# Patient Record
Sex: MALE | Race: BLACK OR AFRICAN AMERICAN | NOT HISPANIC OR LATINO | Employment: OTHER | URBAN - METROPOLITAN AREA
[De-identification: names, ages, dates, MRNs, and addresses within clinical notes are randomized per-mention and may not be internally consistent; named-entity substitution may affect disease eponyms.]

---

## 2019-07-08 ENCOUNTER — HISTORICAL (OUTPATIENT)
Dept: ANESTHESIOLOGY | Facility: HOSPITAL | Age: 59
End: 2019-07-08

## 2020-04-29 PROBLEM — C90.00 MULTIPLE MYELOMA: Status: ACTIVE | Noted: 2019-12-07

## 2020-04-30 PROBLEM — G89.29 BACK PAIN, CHRONIC: Status: ACTIVE | Noted: 2019-12-01

## 2020-04-30 PROBLEM — M54.9 BACK PAIN, CHRONIC: Status: ACTIVE | Noted: 2019-12-01

## 2020-05-01 PROBLEM — F17.210 SMOKING 1/2 PACK A DAY OR LESS: Chronic | Status: ACTIVE | Noted: 2020-05-01

## 2020-06-22 PROBLEM — Z52.011 AUTOLOGOUS DONOR OF STEM CELLS: Status: ACTIVE | Noted: 2020-06-22

## 2020-06-30 ENCOUNTER — TELEPHONE (OUTPATIENT)
Dept: PHARMACY | Facility: CLINIC | Age: 60
End: 2020-06-30

## 2020-06-30 PROBLEM — R76.8 HEPATITIS B CORE ANTIBODY POSITIVE: Status: ACTIVE | Noted: 2020-06-30

## 2020-06-30 PROBLEM — Z29.9 NEED FOR PROPHYLACTIC MEASURE: Status: ACTIVE | Noted: 2020-06-30

## 2020-06-30 NOTE — TELEPHONE ENCOUNTER
Informed Spouse Holli  that [facility] received prescription for Entecavir and benefits investigation is required.  [facility] will be back in touch once insurance determination is received.

## 2020-07-06 PROBLEM — Z51.11 ENCOUNTER FOR ANTINEOPLASTIC CHEMOTHERAPY: Status: ACTIVE | Noted: 2020-07-06

## 2020-07-08 PROBLEM — E83.51 HYPOCALCEMIA: Status: ACTIVE | Noted: 2020-07-08

## 2020-07-09 PROBLEM — I10 HTN (HYPERTENSION): Status: ACTIVE | Noted: 2020-07-09

## 2020-07-11 PROBLEM — E83.39 HYPOPHOSPHATEMIA: Status: ACTIVE | Noted: 2020-07-11

## 2020-07-15 PROBLEM — D70.9 NEUTROPENIA: Status: ACTIVE | Noted: 2020-07-15

## 2020-07-15 PROBLEM — M25.519 SHOULDER PAIN: Status: ACTIVE | Noted: 2020-07-15

## 2020-07-16 PROBLEM — R11.0 NAUSEA: Status: ACTIVE | Noted: 2020-07-16

## 2020-07-16 PROBLEM — E87.6 HYPOKALEMIA: Status: ACTIVE | Noted: 2020-07-16

## 2020-07-17 PROBLEM — R19.7 DIARRHEA: Status: ACTIVE | Noted: 2020-07-17

## 2020-07-19 PROBLEM — R51.9 HEADACHE: Status: ACTIVE | Noted: 2020-07-19

## 2020-07-21 PROBLEM — R79.9 HEMATOLOGIC ABNORMALITY: Status: ACTIVE | Noted: 2020-07-21

## 2020-07-21 PROBLEM — B99.9 INFECTIOUS DISEASE: Status: ACTIVE | Noted: 2020-07-21

## 2020-07-21 PROBLEM — E83.51 HYPOCALCEMIA: Status: ACTIVE | Noted: 2020-07-21

## 2020-09-02 PROBLEM — R19.7 DIARRHEA: Status: RESOLVED | Noted: 2020-07-17 | Resolved: 2020-09-02

## 2020-09-02 PROBLEM — R11.0 NAUSEA: Status: RESOLVED | Noted: 2020-07-16 | Resolved: 2020-09-02

## 2020-09-02 PROBLEM — D70.9 NEUTROPENIA: Status: RESOLVED | Noted: 2020-07-15 | Resolved: 2020-09-02

## 2020-09-02 PROBLEM — R51.9 HEADACHE: Status: RESOLVED | Noted: 2020-07-19 | Resolved: 2020-09-02

## 2020-12-17 PROBLEM — Z92.21 S/P CHEMOTHERAPY, TIME SINCE GREATER THAN 12 WEEKS: Status: ACTIVE | Noted: 2020-12-17

## 2020-12-17 PROBLEM — Z79.899 MEDICATION MANAGEMENT: Status: ACTIVE | Noted: 2020-07-06

## 2020-12-17 PROBLEM — D64.9 ANEMIA: Status: ACTIVE | Noted: 2020-12-17

## 2020-12-17 PROBLEM — Z94.81 S/P AUTOLOGOUS BONE MARROW TRANSPLANTATION: Status: ACTIVE | Noted: 2020-12-17

## 2021-01-14 PROBLEM — D84.9 IMMUNOCOMPROMISED: Status: ACTIVE | Noted: 2021-01-14

## 2021-07-12 PROBLEM — F43.22 ADJUSTMENT DISORDER WITH ANXIETY: Status: ACTIVE | Noted: 2021-07-12

## 2021-07-12 PROBLEM — Z86.19 HISTORY OF HEPATITIS B: Status: ACTIVE | Noted: 2021-07-12

## 2021-07-12 PROBLEM — G47.33 OBSTRUCTIVE SLEEP APNEA SYNDROME: Status: ACTIVE | Noted: 2021-07-12

## 2021-07-12 PROBLEM — B35.3 TINEA PEDIS: Status: ACTIVE | Noted: 2021-07-12

## 2021-07-12 PROBLEM — M51.26 HERNIATED LUMBAR INTERVERTEBRAL DISC: Status: ACTIVE | Noted: 2021-07-12

## 2021-07-12 PROBLEM — Z72.0 TOBACCO USER: Status: ACTIVE | Noted: 2021-07-12

## 2021-07-12 PROBLEM — M54.16 LUMBAR RADICULOPATHY: Status: ACTIVE | Noted: 2021-07-12

## 2021-07-12 PROBLEM — E78.5 HYPERLIPIDEMIA: Status: ACTIVE | Noted: 2021-07-12

## 2021-09-09 PROBLEM — C90.01 MULTIPLE MYELOMA IN REMISSION: Status: ACTIVE | Noted: 2019-12-07

## 2021-11-05 PROBLEM — E83.51 HYPOCALCEMIA: Status: RESOLVED | Noted: 2020-07-08 | Resolved: 2021-11-05

## 2021-11-05 PROBLEM — Z52.011 AUTOLOGOUS DONOR OF STEM CELLS: Status: RESOLVED | Noted: 2020-06-22 | Resolved: 2021-11-05

## 2021-11-05 PROBLEM — B99.9 INFECTIOUS DISEASE: Status: RESOLVED | Noted: 2020-07-21 | Resolved: 2021-11-05

## 2021-11-05 PROBLEM — E87.6 HYPOKALEMIA: Status: RESOLVED | Noted: 2020-07-16 | Resolved: 2021-11-05

## 2021-11-05 PROBLEM — E83.39 HYPOPHOSPHATEMIA: Status: RESOLVED | Noted: 2020-07-11 | Resolved: 2021-11-05

## 2021-11-05 PROBLEM — E83.51 HYPOCALCEMIA: Status: RESOLVED | Noted: 2020-07-21 | Resolved: 2021-11-05

## 2021-12-02 ENCOUNTER — PATIENT MESSAGE (OUTPATIENT)
Dept: RESEARCH | Facility: HOSPITAL | Age: 61
End: 2021-12-02